# Patient Record
Sex: FEMALE | URBAN - METROPOLITAN AREA
[De-identification: names, ages, dates, MRNs, and addresses within clinical notes are randomized per-mention and may not be internally consistent; named-entity substitution may affect disease eponyms.]

---

## 2019-11-21 ENCOUNTER — IMPORTED ENCOUNTER (OUTPATIENT)
Dept: URBAN - METROPOLITAN AREA CLINIC 9 | Facility: CLINIC | Age: 79
End: 2019-11-21

## 2020-09-15 NOTE — PATIENT DISCUSSION
? HOW MUCH IT IS 2ND TO PARKINSONS.
ANTIOXIDANTS WITHOUT ZINC based on Macular Risk test and recent data is recommended.
BMI Within normal limits, continue current management.
CONTINUES TO FEELS VA GETTING WORSE.
Does NOT APPEAR VISUALLY SIGNIFICANT.
ERM appears VISUALLY SIGNIFICANT.
HAS PARKINSON'S - TO REVIEW WITH NEUROLOGIST.
Macular Risk DNA test shows HIGH GENETIC risk.
My findings and recommendations are based on the patient's symptoms, exam, diagnostic testing and records.
NO PROGRESSION on Imaging.
NON SMOKER.
No retinal holes or tears seen on exam. Recommended OBSERVATION. We reviewed the signs and symptoms of retinal tear/retinal detachment and the importance of prompt evaluation should there be increasing floaters, new flashing lights, or decreasing peripheral vision in either eye at any time. Patient understands condition, prognosis and need for follow up care.
OTHERWISE REEVAL IN 6 MOTHS.
PT WANTS TO HOLD ON SURGERY - IT SHE WANTS TO PROCEED SHE IS TO CALL.
REVIEWED OPTIONS PLG.
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
Recommended OBSERVATION and MONITORING for change.
Recommended observation.
Recommended weight and BMI control through healthy diet and exercise, green leafy veggies, UV protection, and not smoking. Reviewed the benefits of AREDS II VITAMINS VERUS GENOTYPE directed vitamin therapy, and recommended following one or the other to try and prevent the progression of the disease. Reviewed the importance of daily monitoring of the vision in each eye independently, along with the use of the Amsler grid daily and instructed patient to call and return immediately for any new changes in their vision or on the Amsler grid. Patient instructed on the importance of regular follow up and monitoring for the early detection of conversion to wet AMD as early detection results in early treatment and better outcomes.
The patient is at high risk for a choroidal neovascular membrane. NO CNV SEEN TODAY. 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
Use artificial tears to affected eye(s) as needed.
Well positioned.
none

## 2021-10-18 ASSESSMENT — KERATOMETRY
OS_AXISANGLE_DEGREES: 174
OS_AXISANGLE2_DEGREES: 84
OD_AXISANGLE2_DEGREES: 86
OD_AXISANGLE_DEGREES: 176
OS_K1POWER_DIOPTERS: 44.25
OD_K1POWER_DIOPTERS: 44.5
OS_K2POWER_DIOPTERS: 44.75
OD_K2POWER_DIOPTERS: 44.75

## 2021-10-18 ASSESSMENT — TONOMETRY
OD_IOP_MMHG: 16
OS_IOP_MMHG: 16

## 2021-10-18 ASSESSMENT — VISUAL ACUITY
OD_SC: 20/30 - SN
OS_SC: 20/30 -2 SN
OS_CC: 20/25 SN
OD_CC: 20/25 - SN

## 2022-06-21 RX ORDER — ESCITALOPRAM OXALATE 10 MG/1
TABLET ORAL
COMMUNITY

## 2022-06-21 RX ORDER — FERROUS SULFATE 325(65) MG
TABLET ORAL
COMMUNITY

## 2022-06-21 RX ORDER — HYDRALAZINE HYDROCHLORIDE 25 MG/1
TABLET, FILM COATED ORAL
COMMUNITY

## 2022-06-21 RX ORDER — UMECLIDINIUM BROMIDE AND VILANTEROL TRIFENATATE 62.5; 25 UG/1; UG/1
1 POWDER RESPIRATORY (INHALATION)
COMMUNITY

## 2022-06-21 RX ORDER — FUROSEMIDE 40 MG/1
TABLET ORAL
COMMUNITY

## 2022-06-21 RX ORDER — DILTIAZEM HYDROCHLORIDE 120 MG/1
1 CAPSULE, EXTENDED RELEASE ORAL
COMMUNITY

## 2022-06-21 RX ORDER — ALPRAZOLAM 0.5 MG/1
TABLET ORAL
COMMUNITY

## 2022-06-21 RX ORDER — LOSARTAN POTASSIUM 25 MG/1
TABLET ORAL
COMMUNITY

## 2024-05-22 NOTE — PATIENT DISCUSSION
? HOW MUCH IT IS 2ND TO PARKINSONS.
ANTIOXIDANTS WITHOUT ZINC based on Macular Risk test and recent data is recommended.
BMI Within normal limits, continue current management.
Does NOT APPEAR VISUALLY SIGNIFICANT.
ERM appears VISUALLY SIGNIFICANT.
HAS PARKINSON'S - TO REVIEW WITH NEUROLOGIST.
IT SHE WANTS TO PROCEED SHE IS TO CALL.
Macular Risk DNA test shows HIGH GENETIC risk.
My findings and recommendations are based on the patient's symptoms, exam, diagnostic testing and records.
NO PROGRESSION on Imaging.
NON SMOKER.
No retinal holes or tears seen on exam. Recommended OBSERVATION. We reviewed the signs and symptoms of retinal tear/retinal detachment and the importance of prompt evaluation should there be increasing floaters, new flashing lights, or decreasing peripheral vision in either eye at any time. Patient understands condition, prognosis and need for follow up care.
OTHERWISE REEVAL IN 6 MOTHS.
PT FEELS VA GETTING WORSE.
REVIEWED OPTIONS PLG.
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
Recommended OBSERVATION and MONITORING for change.
Recommended observation.
Recommended weight and BMI control through healthy diet and exercise, green leafy veggies, UV protection, and not smoking. Reviewed the benefits of AREDS II VITAMINS VERUS GENOTYPE directed vitamin therapy, and recommended following one or the other to try and prevent the progression of the disease. Reviewed the importance of daily monitoring of the vision in each eye independently, along with the use of the Amsler grid daily and instructed patient to call and return immediately for any new changes in their vision or on the Amsler grid. Patient instructed on the importance of regular follow up and monitoring for the early detection of conversion to wet AMD as early detection results in early treatment and better outcomes.
The patient is at high risk for a choroidal neovascular membrane. NO CNV SEEN TODAY. 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
Use artificial tears to affected eye(s) as needed.
Well positioned.
Patient